# Patient Record
Sex: FEMALE | Race: ASIAN | NOT HISPANIC OR LATINO | URBAN - METROPOLITAN AREA
[De-identification: names, ages, dates, MRNs, and addresses within clinical notes are randomized per-mention and may not be internally consistent; named-entity substitution may affect disease eponyms.]

---

## 2020-07-25 ENCOUNTER — TELEPHONE ENCOUNTER (OUTPATIENT)
Dept: URBAN - METROPOLITAN AREA CLINIC 13 | Facility: CLINIC | Age: 60
End: 2020-07-25

## 2020-07-25 RX ORDER — HYOSCYAMINE SULFATE 0.12 MG/1
PLACE 1 TABLET UNDER THE TONGUE EVERY 4 TO 6 HOURS AS NEEDED TABLET, ORALLY DISINTEGRATING ORAL
Qty: 60 | Refills: 2 | OUTPATIENT
Start: 2013-12-21 | End: 2019-11-07

## 2020-07-25 RX ORDER — METRONIDAZOLE 250 MG/1
TAKE 1 TABLET 3 TIMES DAILY FOR 14 DAYS TABLET ORAL
Qty: 42 | Refills: 1 | OUTPATIENT
Start: 2014-01-03 | End: 2014-02-21

## 2020-07-25 RX ORDER — LATANOPROST/PF 0.005 %
INSTILL 1 DROP  INTO AFFECTED EYE(S) ONCE DAILY AS DIRECTED DROPS OPHTHALMIC (EYE)
Qty: 5 | Refills: 0 | OUTPATIENT
Start: 2013-11-18 | End: 2019-11-07

## 2020-07-25 RX ORDER — POLYETHYLENE GLYCOL 3350, SODIUM SULFATE, SODIUM CHLORIDE, POTASSIUM CHLORIDE, ASCORBIC ACID, SODIUM ASCORBATE 7.5-2.691G
TAKE 32 OZ AS DIRECTED 5:00PM THE EVENING BEFORE AND 6HR PRIOR TO PROCEDURE KIT ORAL
Qty: 1 | Refills: 0 | OUTPATIENT
Start: 2013-12-05 | End: 2014-02-21

## 2020-07-25 RX ORDER — METRONIDAZOLE 500 MG/1
TAKE 1 TABLET 3 TIMES DAILY FOR 10 DAYS TABLET ORAL
Qty: 30 | Refills: 0 | OUTPATIENT
Start: 2013-12-13 | End: 2014-01-06

## 2020-07-25 RX ORDER — CITALOPRAM 20 MG/1
TABLET ORAL
Qty: 90 | Refills: 0 | OUTPATIENT
Start: 2012-03-08 | End: 2014-02-21

## 2020-07-26 ENCOUNTER — TELEPHONE ENCOUNTER (OUTPATIENT)
Dept: URBAN - METROPOLITAN AREA CLINIC 13 | Facility: CLINIC | Age: 60
End: 2020-07-26

## 2020-07-26 RX ORDER — BENZONATATE 100 MG/1
CAPSULE ORAL
Qty: 40 | Refills: 0 | Status: ACTIVE | COMMUNITY
Start: 2013-06-18

## 2020-07-26 RX ORDER — SULFAMETHOXAZOLE AND TRIMETHOPRIM 800; 160 MG/1; MG/1
TABLET ORAL
Qty: 14 | Refills: 0 | Status: ACTIVE | COMMUNITY
Start: 2019-09-09

## 2020-07-26 RX ORDER — SODIUM SULFATE, POTASSIUM SULFATE, MAGNESIUM SULFATE 17.5; 3.13; 1.6 G/ML; G/ML; G/ML
5PM THE DAY BEFORE PROCEDURE, DRINK 1/2 OF PREP, THEN 6HOURS BEFORE PROCEDURE DRINK REMAINDER OF PREP SOLUTION, CONCENTRATE ORAL
Qty: 1 | Refills: 0 | Status: ACTIVE | COMMUNITY
Start: 2019-11-07

## 2020-07-26 RX ORDER — VALACYCLOVIR HYDROCHLORIDE 500 MG/1
TABLET, FILM COATED ORAL
Qty: 14 | Refills: 0 | Status: ACTIVE | COMMUNITY
Start: 2019-05-16

## 2020-07-26 RX ORDER — PHENAZOPYRIDINE HYDROCHLORIDE 100 MG/1
TAKE 1 TABLET (100 MG TOTAL) BY MOUTH 3 (THREE) TIMES DAILY AS NEEDED TABLET ORAL
Qty: 10 | Refills: 0 | Status: ACTIVE | COMMUNITY
Start: 2019-09-09

## 2020-07-26 RX ORDER — AMOXICILLIN AND CLAVULANATE POTASSIUM 875; 125 MG/1; MG/1
TABLET, FILM COATED ORAL
Qty: 20 | Refills: 0 | Status: ACTIVE | COMMUNITY
Start: 2013-06-18

## 2020-07-26 RX ORDER — DEXTROAMPHETAMINE SACCHARATE, AMPHETAMINE ASPARTATE MONOHYDRATE, DEXTROAMPHETAMINE SULFATE, AND AMPHETAMINE SULFATE 6.25; 6.25; 6.25; 6.25 MG/1; MG/1; MG/1; MG/1
CAPSULE, EXTENDED RELEASE ORAL
Qty: 30 | Refills: 0 | Status: ACTIVE | COMMUNITY
Start: 2019-01-31

## 2020-07-26 RX ORDER — DEXTROAMPHETAMINE SACCHARATE, AMPHETAMINE ASPARTATE, DEXTROAMPHETAMINE SULFATE AND AMPHETAMINE SULFATE 5; 5; 5; 5 MG/1; MG/1; MG/1; MG/1
TABLET ORAL
Qty: 60 | Refills: 0 | Status: ACTIVE | COMMUNITY
Start: 2019-04-10

## 2020-07-26 RX ORDER — HYDROCODONE BITARTRATE AND ACETAMINOPHEN 10; 325 MG/1; MG/1
TABLET ORAL
Qty: 14 | Refills: 0 | Status: ACTIVE | COMMUNITY
Start: 2019-11-12

## 2020-07-26 RX ORDER — AZITHROMYCIN DIHYDRATE 250 MG/1
TABLET, FILM COATED ORAL
Qty: 6 | Refills: 0 | Status: ACTIVE | COMMUNITY
Start: 2014-05-15

## 2020-07-26 RX ORDER — ESTROGENS, CONJUGATED 0.62 MG/1
TAKE 1 TABLET  EVERY THREE DAYS TABLET, FILM COATED ORAL
Refills: 0 | Status: ACTIVE | COMMUNITY
Start: 2013-02-13

## 2020-07-26 RX ORDER — PROMETHAZINE HYDROCHLORIDE 25 MG/1
TABLET ORAL
Qty: 20 | Refills: 0 | Status: ACTIVE | COMMUNITY
Start: 2019-11-14

## 2020-07-26 RX ORDER — AMLODIPINE BESYLATE 5 MG/1
TAKE 1 TABLET DAILY TABLET ORAL
Qty: 90 | Refills: 0 | Status: ACTIVE | COMMUNITY
Start: 2012-03-08

## 2020-07-26 RX ORDER — ALPRAZOLAM 0.5 MG/1
TABLET ORAL
Qty: 30 | Refills: 0 | Status: ACTIVE | COMMUNITY
Start: 2013-03-13

## 2020-07-26 RX ORDER — TRAMADOL HYDROCHLORIDE 50 MG/1
TABLET ORAL
Qty: 15 | Refills: 0 | Status: ACTIVE | COMMUNITY
Start: 2019-12-13

## 2020-07-26 RX ORDER — MOMETASONE FUROATE 1 MG/G
CREAM TOPICAL
Qty: 45 | Refills: 0 | Status: ACTIVE | COMMUNITY
Start: 2019-05-06

## 2020-07-26 RX ORDER — CITALOPRAM 20 MG/1
TAKE 1 TABLET DAILY TABLET ORAL
Refills: 0 | Status: ACTIVE | COMMUNITY

## 2020-07-26 RX ORDER — ENALAPRIL MALEATE 10 MG/1
TABLET ORAL
Qty: 90 | Refills: 0 | Status: ACTIVE | COMMUNITY
Start: 2019-09-09

## 2020-07-26 RX ORDER — ENALAPRIL MALEATE 5 MG/1
TABLET ORAL
Qty: 30 | Refills: 0 | Status: ACTIVE | COMMUNITY
Start: 2019-06-07

## 2020-07-26 RX ORDER — VALACYCLOVIR 1 G/1
TABLET, FILM COATED ORAL
Qty: 21 | Refills: 0 | Status: ACTIVE | COMMUNITY
Start: 2020-01-06

## 2020-07-26 RX ORDER — CYCLOSPORINE 0.5 MG/ML
EMULSION OPHTHALMIC
Qty: 180 | Refills: 0 | Status: ACTIVE | COMMUNITY
Start: 2019-03-05

## 2020-07-26 RX ORDER — HYDROCHLOROTHIAZIDE 12.5 MG/1
TABLET ORAL
Qty: 30 | Refills: 0 | Status: ACTIVE | COMMUNITY
Start: 2019-05-03

## 2020-07-26 RX ORDER — AMOXICILLIN 875 MG/1
TABLET, FILM COATED ORAL
Qty: 20 | Refills: 0 | Status: ACTIVE | COMMUNITY
Start: 2019-10-16

## 2020-07-26 RX ORDER — LEVOTHYROXINE SODIUM 88 UG/1
TABLET ORAL
Qty: 90 | Refills: 0 | Status: ACTIVE | COMMUNITY
Start: 2019-02-25

## 2020-07-26 RX ORDER — ERGOCALCIFEROL 1.25 MG/1
CAPSULE ORAL
Qty: 12 | Refills: 0 | Status: ACTIVE | COMMUNITY
Start: 2019-07-03

## 2020-07-26 RX ORDER — CHROMIUM 200 MCG
TAKE 1 TABLET DAILY TABLET ORAL
Refills: 0 | Status: ACTIVE | COMMUNITY

## 2020-07-26 RX ORDER — ONDANSETRON 8 MG/1
TABLET ORAL
Qty: 20 | Refills: 0 | Status: ACTIVE | COMMUNITY
Start: 2019-10-16

## 2020-07-26 RX ORDER — DOCUSATE SODIUM 100 MG/1
TAKE 1 CAPSULE BY MOUTH TWICE A DAY CAPSULE, LIQUID FILLED ORAL
Qty: 10 | Refills: 0 | Status: ACTIVE | COMMUNITY
Start: 2019-10-16

## 2020-07-26 RX ORDER — MINOXIDIL 2.5 MG/1
TABLET ORAL
Qty: 30 | Refills: 0 | Status: ACTIVE | COMMUNITY
Start: 2019-02-21

## 2020-07-26 RX ORDER — CITALOPRAM 40 MG/1
TABLET ORAL
Qty: 90 | Refills: 0 | Status: ACTIVE | COMMUNITY
Start: 2019-06-03

## 2020-07-26 RX ORDER — MELOXICAM 15 MG/1
TABLET ORAL
Qty: 90 | Refills: 0 | Status: ACTIVE | COMMUNITY
Start: 2019-05-07

## 2020-07-26 RX ORDER — CYCLOSPORINE 0.5 MG/ML
EMULSION OPHTHALMIC
Qty: 180 | Refills: 0 | Status: ACTIVE | COMMUNITY
Start: 2012-12-14

## 2020-07-26 RX ORDER — MELOXICAM 15 MG/1
TABLET ORAL
Qty: 30 | Refills: 0 | Status: ACTIVE | COMMUNITY
Start: 2014-07-07

## 2020-07-26 RX ORDER — LEVOTHYROXINE SODIUM 50 UG/1
TAKE 1 TABLET DAILY TABLET ORAL
Refills: 0 | Status: ACTIVE | COMMUNITY
Start: 2012-03-08

## 2020-07-26 RX ORDER — METHYLPREDNISOLONE 4 MG/1
TABLET ORAL
Qty: 21 | Refills: 0 | Status: ACTIVE | COMMUNITY
Start: 2019-06-25

## 2020-12-14 ENCOUNTER — WEB ENCOUNTER (OUTPATIENT)
Dept: URBAN - METROPOLITAN AREA CLINIC 113 | Facility: CLINIC | Age: 60
End: 2020-12-14

## 2020-12-14 ENCOUNTER — LAB OUTSIDE AN ENCOUNTER (OUTPATIENT)
Dept: URBAN - METROPOLITAN AREA CLINIC 113 | Facility: CLINIC | Age: 60
End: 2020-12-14

## 2020-12-14 ENCOUNTER — OFFICE VISIT (OUTPATIENT)
Dept: URBAN - METROPOLITAN AREA CLINIC 113 | Facility: CLINIC | Age: 60
End: 2020-12-14
Payer: COMMERCIAL

## 2020-12-14 VITALS
HEIGHT: 60 IN | BODY MASS INDEX: 22.78 KG/M2 | SYSTOLIC BLOOD PRESSURE: 151 MMHG | WEIGHT: 116 LBS | DIASTOLIC BLOOD PRESSURE: 82 MMHG | RESPIRATION RATE: 18 BRPM | TEMPERATURE: 98.1 F | HEART RATE: 82 BPM

## 2020-12-14 DIAGNOSIS — Z86.010 HISTORY OF ADENOMATOUS POLYP OF COLON: ICD-10-CM

## 2020-12-14 DIAGNOSIS — D50.8 OTHER IRON DEFICIENCY ANEMIA: ICD-10-CM

## 2020-12-14 PROBLEM — 87522002: Status: ACTIVE | Noted: 2020-12-14

## 2020-12-14 PROBLEM — 429047008: Status: ACTIVE | Noted: 2020-12-14

## 2020-12-14 PROCEDURE — 99204 OFFICE O/P NEW MOD 45 MIN: CPT | Performed by: NURSE PRACTITIONER

## 2020-12-14 PROCEDURE — G8427 DOCREV CUR MEDS BY ELIG CLIN: HCPCS | Performed by: NURSE PRACTITIONER

## 2020-12-14 PROCEDURE — G8482 FLU IMMUNIZE ORDER/ADMIN: HCPCS | Performed by: NURSE PRACTITIONER

## 2020-12-14 PROCEDURE — G9903 PT SCRN TBCO ID AS NON USER: HCPCS | Performed by: NURSE PRACTITIONER

## 2020-12-14 RX ORDER — CITALOPRAM 20 MG/1
TAKE 1 TABLET DAILY TABLET ORAL
Refills: 0 | Status: ACTIVE | COMMUNITY

## 2020-12-14 RX ORDER — AMLODIPINE BESYLATE 10 MG/1
1 TABLET TABLET ORAL ONCE A DAY
Refills: 0 | Status: ACTIVE | COMMUNITY
Start: 2012-03-08

## 2020-12-14 RX ORDER — GABAPENTIN 100 MG/1
1 CAPSULE CAPSULE ORAL AT BEDTIME
Status: ACTIVE | COMMUNITY

## 2020-12-14 RX ORDER — MINOXIDIL 2.5 MG/1
1 TABLET TABLET ORAL ONCE A DAY
Refills: 0 | Status: ACTIVE | COMMUNITY
Start: 2019-02-21

## 2020-12-14 RX ORDER — LEVOTHYROXINE SODIUM 88 UG/1
1 TABLET IN THE MORNING ON AN EMPTY STOMACH TABLET ORAL ONCE A DAY
Refills: 0 | Status: ACTIVE | COMMUNITY
Start: 2019-02-25

## 2020-12-14 RX ORDER — ENALAPRIL MALEATE 10 MG/1
1 TABLET TABLET ORAL ONCE A DAY
Refills: 0 | Status: ACTIVE | COMMUNITY
Start: 2019-09-09

## 2020-12-14 RX ORDER — ESTROGENS, CONJUGATED 0.62 MG/1
TAKE 1 TABLET  EVERY THREE DAYS TABLET, FILM COATED ORAL
Refills: 0 | Status: ACTIVE | COMMUNITY
Start: 2013-02-13

## 2020-12-14 RX ORDER — SODIUM, POTASSIUM,MAG SULFATES 17.5-3.13G
354 ML SOLUTION, RECONSTITUTED, ORAL ORAL ONCE
Qty: 354 MILLILITER | Refills: 0 | OUTPATIENT
Start: 2020-12-14 | End: 2020-12-15

## 2020-12-14 RX ORDER — IRON POLYSACCHARIDE COMPLEX 150 MG
1 CAPSULE CAPSULE ORAL ONCE A DAY
Status: ACTIVE | COMMUNITY

## 2020-12-14 NOTE — HPI-OTHER HISTORIES
Labs 9/11/2020: CBC: WBC 9.44, hemoglobin 11.4, MCV 96.5, platelet 497.  Iron saturation 12.6%, ferritin 46.5, iron 43, TIBC 341.  11/10/2020 CBC: WBC 9.34, hemoglobin 12.3, MCV 95.5, platelet 523.  Iron saturation 31, ferritin 101, iron 96, TIBC 314.  Colonoscopy 12/20/2013: Good prep, congested mucosa in the sigmoid colon status post biopsy, sigmoid diverticulosis, removal of a 6 mm sigmoid tubular adenoma.  Random biopsies demonstrated benign unremarkable intact colonic mucosa with no significant histopathology.  Cecal biopsy demonstrated tubular adenoma.

## 2020-12-14 NOTE — HPI-TODAY'S VISIT:
This is a 60-year-old female with a history of hypothyroidism, hypertension, adenomatous colon polyps due surveillance referred from Dr. Say Yeager for evaluation of iron deficiency.    She has chronic loose bowel movements.  She typically has more than 3 bowel movements per day.  Her symptoms are worse after ingesting oily food.  She denies red blood per rectum, melena, or any other abdominal symptoms.  She infrequently uses ibuprofen.  She denies a family history of GI cancers.

## 2020-12-30 ENCOUNTER — OFFICE VISIT (OUTPATIENT)
Dept: URBAN - METROPOLITAN AREA SURGERY CENTER 25 | Facility: SURGERY CENTER | Age: 60
End: 2020-12-30
Payer: COMMERCIAL

## 2020-12-30 ENCOUNTER — CLAIMS CREATED FROM THE CLAIM WINDOW (OUTPATIENT)
Dept: URBAN - METROPOLITAN AREA CLINIC 4 | Facility: CLINIC | Age: 60
End: 2020-12-30
Payer: COMMERCIAL

## 2020-12-30 DIAGNOSIS — K63.5 BENIGN COLON POLYP: ICD-10-CM

## 2020-12-30 DIAGNOSIS — K63.89 OTHER SPECIFIED DISEASES OF INTESTINE: ICD-10-CM

## 2020-12-30 DIAGNOSIS — Z86.010 H/O ADENOMATOUS POLYP OF COLON: ICD-10-CM

## 2020-12-30 PROCEDURE — G9936 PMH PLYP/NEO CO/RECT/JUN/ANS: HCPCS | Performed by: INTERNAL MEDICINE

## 2020-12-30 PROCEDURE — 88305 TISSUE EXAM BY PATHOLOGIST: CPT | Performed by: PATHOLOGY

## 2020-12-30 PROCEDURE — G8907 PT DOC NO EVENTS ON DISCHARG: HCPCS | Performed by: INTERNAL MEDICINE

## 2020-12-30 PROCEDURE — 45385 COLONOSCOPY W/LESION REMOVAL: CPT | Performed by: INTERNAL MEDICINE

## 2020-12-30 RX ORDER — ENALAPRIL MALEATE 10 MG/1
1 TABLET TABLET ORAL ONCE A DAY
Refills: 0 | Status: ACTIVE | COMMUNITY
Start: 2019-09-09

## 2020-12-30 RX ORDER — CITALOPRAM 20 MG/1
TAKE 1 TABLET DAILY TABLET ORAL
Refills: 0 | Status: ACTIVE | COMMUNITY

## 2020-12-30 RX ORDER — IRON POLYSACCHARIDE COMPLEX 150 MG
1 CAPSULE CAPSULE ORAL ONCE A DAY
Status: ACTIVE | COMMUNITY

## 2020-12-30 RX ORDER — ESTROGENS, CONJUGATED 0.62 MG/1
TAKE 1 TABLET  EVERY THREE DAYS TABLET, FILM COATED ORAL
Refills: 0 | Status: ACTIVE | COMMUNITY
Start: 2013-02-13

## 2020-12-30 RX ORDER — GABAPENTIN 100 MG/1
1 CAPSULE CAPSULE ORAL AT BEDTIME
Status: ACTIVE | COMMUNITY

## 2020-12-30 RX ORDER — LEVOTHYROXINE SODIUM 88 UG/1
1 TABLET IN THE MORNING ON AN EMPTY STOMACH TABLET ORAL ONCE A DAY
Refills: 0 | Status: ACTIVE | COMMUNITY
Start: 2019-02-25

## 2020-12-30 RX ORDER — AMLODIPINE BESYLATE 10 MG/1
1 TABLET TABLET ORAL ONCE A DAY
Refills: 0 | Status: ACTIVE | COMMUNITY
Start: 2012-03-08

## 2020-12-30 RX ORDER — MINOXIDIL 2.5 MG/1
1 TABLET TABLET ORAL ONCE A DAY
Refills: 0 | Status: ACTIVE | COMMUNITY
Start: 2019-02-21

## 2021-01-04 ENCOUNTER — WEB ENCOUNTER (OUTPATIENT)
Dept: URBAN - METROPOLITAN AREA CLINIC 113 | Facility: CLINIC | Age: 61
End: 2021-01-04

## 2021-02-15 ENCOUNTER — WEB ENCOUNTER (OUTPATIENT)
Dept: URBAN - METROPOLITAN AREA CLINIC 113 | Facility: CLINIC | Age: 61
End: 2021-02-15

## 2021-02-15 ENCOUNTER — OFFICE VISIT (OUTPATIENT)
Dept: URBAN - METROPOLITAN AREA CLINIC 113 | Facility: CLINIC | Age: 61
End: 2021-02-15
Payer: COMMERCIAL

## 2021-02-15 ENCOUNTER — DASHBOARD ENCOUNTERS (OUTPATIENT)
Age: 61
End: 2021-02-15

## 2021-02-15 VITALS
SYSTOLIC BLOOD PRESSURE: 148 MMHG | DIASTOLIC BLOOD PRESSURE: 80 MMHG | BODY MASS INDEX: 22.78 KG/M2 | HEART RATE: 78 BPM | HEIGHT: 60 IN | WEIGHT: 116 LBS | TEMPERATURE: 98.2 F | RESPIRATION RATE: 18 BRPM

## 2021-02-15 DIAGNOSIS — K64.8 INTERNAL HEMORRHOIDS: ICD-10-CM

## 2021-02-15 DIAGNOSIS — Z12.11 COLON CANCER SCREENING: ICD-10-CM

## 2021-02-15 DIAGNOSIS — E61.1 IRON DEFICIENCY: ICD-10-CM

## 2021-02-15 DIAGNOSIS — L29.0 PRURITUS ANI: ICD-10-CM

## 2021-02-15 PROBLEM — 35240004: Status: ACTIVE | Noted: 2021-02-15

## 2021-02-15 PROBLEM — 90458007: Status: ACTIVE | Noted: 2021-02-15

## 2021-02-15 PROBLEM — 90446007: Status: ACTIVE | Noted: 2021-02-15

## 2021-02-15 PROBLEM — 305058001: Status: ACTIVE | Noted: 2021-02-15

## 2021-02-15 PROCEDURE — G9903 PT SCRN TBCO ID AS NON USER: HCPCS | Performed by: NURSE PRACTITIONER

## 2021-02-15 PROCEDURE — G8482 FLU IMMUNIZE ORDER/ADMIN: HCPCS | Performed by: NURSE PRACTITIONER

## 2021-02-15 PROCEDURE — 3017F COLORECTAL CA SCREEN DOC REV: CPT | Performed by: NURSE PRACTITIONER

## 2021-02-15 PROCEDURE — G8427 DOCREV CUR MEDS BY ELIG CLIN: HCPCS | Performed by: NURSE PRACTITIONER

## 2021-02-15 PROCEDURE — 99213 OFFICE O/P EST LOW 20 MIN: CPT | Performed by: NURSE PRACTITIONER

## 2021-02-15 RX ORDER — MINOXIDIL 2.5 MG/1
1 TABLET TABLET ORAL ONCE A DAY
Refills: 0 | Status: ACTIVE | COMMUNITY
Start: 2019-02-21

## 2021-02-15 RX ORDER — GABAPENTIN 100 MG/1
1 CAPSULE CAPSULE ORAL AT BEDTIME
Status: ACTIVE | COMMUNITY

## 2021-02-15 RX ORDER — ENALAPRIL MALEATE 10 MG/1
1 TABLET TABLET ORAL ONCE A DAY
Refills: 0 | Status: ACTIVE | COMMUNITY
Start: 2019-09-09

## 2021-02-15 RX ORDER — IRON POLYSACCHARIDE COMPLEX 150 MG
1 CAPSULE CAPSULE ORAL ONCE A DAY
Status: ACTIVE | COMMUNITY

## 2021-02-15 RX ORDER — LEVOTHYROXINE SODIUM 88 UG/1
1 TABLET IN THE MORNING ON AN EMPTY STOMACH TABLET ORAL ONCE A DAY
Refills: 0 | Status: ACTIVE | COMMUNITY
Start: 2019-02-25

## 2021-02-15 RX ORDER — ESTROGENS, CONJUGATED 0.62 MG/1
TAKE 1 TABLET  EVERY THREE DAYS TABLET, FILM COATED ORAL
Refills: 0 | Status: ACTIVE | COMMUNITY
Start: 2013-02-13

## 2021-02-15 RX ORDER — CITALOPRAM 20 MG/1
TAKE 1 TABLET DAILY TABLET ORAL
Refills: 0 | Status: ACTIVE | COMMUNITY

## 2021-02-15 RX ORDER — MELOXICAM 15 MG/1
1 TABLET TABLET ORAL ONCE A DAY
Status: ACTIVE | COMMUNITY

## 2021-02-15 RX ORDER — AMLODIPINE BESYLATE 10 MG/1
1 TABLET TABLET ORAL ONCE A DAY
Refills: 0 | Status: ACTIVE | COMMUNITY
Start: 2012-03-08

## 2021-02-15 NOTE — HPI-TODAY'S VISIT:
This is a 60-year-old female with a history of hypothyroidism, hypertension, adenomatous colon polyps, and iron deficiency anemia presenting for follow-up after a surveillance colonoscopy.  She was last seen 12/14/2020 referred from Dr. Yeager for evaluation of iron deficiency.  She reported chronic loose bowel movements typically having more than 3 bowel movements per day.  Her symptoms were worse after ingesting oily food.  She denied signs of overt GI blood loss and any other abdominal symptoms.  She infrequently used Imodium.  She was scheduled for a colonoscopy.  EGD was a consideration.  Colonoscopy 12/30/2020: BB PS 8, removal of a 7 mm sigmoid sessile hyperplastic polyp, and nonbleeding internal hemorrhoids. Almost a week after her colonoscopy, she broke out in welts and required Benadryl.  This persisted for 1 or 2 weeks.  She reports recent labs with her primary care provider, Ms. Darnell.  Other than an elevated potassium, her labs were normal.  She is on iron supplements per Dr. Yeager.  She continues to report loose bowels at baseline.  She has intermittent perianal itching.  She denies red blood per rectum or proctalgia.  She denies acid reflux, abdominal pain, or other abdominal symptoms.